# Patient Record
Sex: MALE | Race: WHITE | Employment: OTHER | URBAN - METROPOLITAN AREA
[De-identification: names, ages, dates, MRNs, and addresses within clinical notes are randomized per-mention and may not be internally consistent; named-entity substitution may affect disease eponyms.]

---

## 2019-05-31 ENCOUNTER — HOSPITAL ENCOUNTER (OUTPATIENT)
Facility: CLINIC | Age: 69
Setting detail: OBSERVATION
Discharge: HOME | End: 2019-05-31
Attending: EMERGENCY MEDICINE | Admitting: INTERNAL MEDICINE
Payer: MEDICARE

## 2019-05-31 VITALS
HEIGHT: 72 IN | TEMPERATURE: 98.1 F | RESPIRATION RATE: 16 BRPM | SYSTOLIC BLOOD PRESSURE: 144 MMHG | BODY MASS INDEX: 32.51 KG/M2 | OXYGEN SATURATION: 96 % | WEIGHT: 240 LBS | HEART RATE: 95 BPM | DIASTOLIC BLOOD PRESSURE: 85 MMHG

## 2019-05-31 DIAGNOSIS — I48.92 ATRIAL FLUTTER, UNSPECIFIED TYPE (H): Primary | ICD-10-CM

## 2019-05-31 DIAGNOSIS — I48.4 ATYPICAL ATRIAL FLUTTER (H): ICD-10-CM

## 2019-05-31 LAB
ANION GAP SERPL CALCULATED.3IONS-SCNC: 4 MMOL/L (ref 3–14)
BUN SERPL-MCNC: 16 MG/DL (ref 7–30)
CALCIUM SERPL-MCNC: 8.9 MG/DL (ref 8.5–10.1)
CHLORIDE SERPL-SCNC: 108 MMOL/L (ref 94–109)
CO2 SERPL-SCNC: 28 MMOL/L (ref 20–32)
CREAT SERPL-MCNC: 0.85 MG/DL (ref 0.66–1.25)
GFR SERPL CREATININE-BSD FRML MDRD: >90 ML/MIN/{1.73_M2}
GLUCOSE SERPL-MCNC: 111 MG/DL (ref 70–99)
INTERPRETATION ECG - MUSE: NORMAL
INTERPRETATION ECG - MUSE: NORMAL
MAGNESIUM SERPL-MCNC: 2.2 MG/DL (ref 1.6–2.3)
POTASSIUM SERPL-SCNC: 4.2 MMOL/L (ref 3.4–5.3)
SODIUM SERPL-SCNC: 140 MMOL/L (ref 133–144)
T4 FREE SERPL-MCNC: 1.01 NG/DL (ref 0.76–1.46)
TSH SERPL DL<=0.005 MIU/L-ACNC: 5.06 MU/L (ref 0.4–4)

## 2019-05-31 PROCEDURE — 93005 ELECTROCARDIOGRAM TRACING: CPT | Mod: 76

## 2019-05-31 PROCEDURE — 96365 THER/PROPH/DIAG IV INF INIT: CPT

## 2019-05-31 PROCEDURE — 84439 ASSAY OF FREE THYROXINE: CPT | Performed by: EMERGENCY MEDICINE

## 2019-05-31 PROCEDURE — 96361 HYDRATE IV INFUSION ADD-ON: CPT

## 2019-05-31 PROCEDURE — 80048 BASIC METABOLIC PNL TOTAL CA: CPT | Performed by: EMERGENCY MEDICINE

## 2019-05-31 PROCEDURE — 25000125 ZZHC RX 250: Performed by: EMERGENCY MEDICINE

## 2019-05-31 PROCEDURE — 99207 ZZC APP CREDIT; MD BILLING SHARED VISIT: CPT | Performed by: PHYSICIAN ASSISTANT

## 2019-05-31 PROCEDURE — 25800030 ZZH RX IP 258 OP 636: Performed by: INTERNAL MEDICINE

## 2019-05-31 PROCEDURE — 25000132 ZZH RX MED GY IP 250 OP 250 PS 637: Mod: GY | Performed by: PHYSICIAN ASSISTANT

## 2019-05-31 PROCEDURE — 25000128 H RX IP 250 OP 636: Performed by: EMERGENCY MEDICINE

## 2019-05-31 PROCEDURE — 93005 ELECTROCARDIOGRAM TRACING: CPT

## 2019-05-31 PROCEDURE — 99219 ZZC INITIAL OBSERVATION CARE,LEVL II: CPT | Performed by: INTERNAL MEDICINE

## 2019-05-31 PROCEDURE — 99214 OFFICE O/P EST MOD 30 MIN: CPT | Performed by: INTERNAL MEDICINE

## 2019-05-31 PROCEDURE — G0378 HOSPITAL OBSERVATION PER HR: HCPCS

## 2019-05-31 PROCEDURE — 83735 ASSAY OF MAGNESIUM: CPT | Performed by: EMERGENCY MEDICINE

## 2019-05-31 PROCEDURE — 84443 ASSAY THYROID STIM HORMONE: CPT | Performed by: EMERGENCY MEDICINE

## 2019-05-31 PROCEDURE — 99285 EMERGENCY DEPT VISIT HI MDM: CPT | Mod: 25

## 2019-05-31 RX ORDER — METOPROLOL TARTRATE 25 MG/1
25 TABLET, FILM COATED ORAL 2 TIMES DAILY
Qty: 60 TABLET | Refills: 0 | Status: SHIPPED | OUTPATIENT
Start: 2019-05-31

## 2019-05-31 RX ORDER — BISACODYL 10 MG
10 SUPPOSITORY, RECTAL RECTAL DAILY PRN
Status: DISCONTINUED | OUTPATIENT
Start: 2019-05-31 | End: 2019-05-31 | Stop reason: HOSPADM

## 2019-05-31 RX ORDER — NITROGLYCERIN 0.4 MG/1
0.4 TABLET SUBLINGUAL EVERY 5 MIN PRN
Status: DISCONTINUED | OUTPATIENT
Start: 2019-05-31 | End: 2019-05-31 | Stop reason: HOSPADM

## 2019-05-31 RX ORDER — METOPROLOL TARTRATE 1 MG/ML
2.5 INJECTION, SOLUTION INTRAVENOUS EVERY 4 HOURS PRN
Status: DISCONTINUED | OUTPATIENT
Start: 2019-05-31 | End: 2019-05-31

## 2019-05-31 RX ORDER — ASPIRIN 81 MG/1
81 TABLET ORAL DAILY
COMMUNITY

## 2019-05-31 RX ORDER — METOPROLOL TARTRATE 25 MG/1
25 TABLET, FILM COATED ORAL 2 TIMES DAILY
Status: DISCONTINUED | OUTPATIENT
Start: 2019-05-31 | End: 2019-05-31 | Stop reason: HOSPADM

## 2019-05-31 RX ORDER — AMOXICILLIN 250 MG
2 CAPSULE ORAL 2 TIMES DAILY PRN
Status: DISCONTINUED | OUTPATIENT
Start: 2019-05-31 | End: 2019-05-31 | Stop reason: HOSPADM

## 2019-05-31 RX ORDER — LIDOCAINE 40 MG/G
CREAM TOPICAL
Status: DISCONTINUED | OUTPATIENT
Start: 2019-05-31 | End: 2019-05-31 | Stop reason: HOSPADM

## 2019-05-31 RX ORDER — ACETAMINOPHEN 325 MG/1
650 TABLET ORAL EVERY 4 HOURS PRN
Status: DISCONTINUED | OUTPATIENT
Start: 2019-05-31 | End: 2019-05-31 | Stop reason: HOSPADM

## 2019-05-31 RX ORDER — ONDANSETRON 2 MG/ML
4 INJECTION INTRAMUSCULAR; INTRAVENOUS EVERY 6 HOURS PRN
Status: DISCONTINUED | OUTPATIENT
Start: 2019-05-31 | End: 2019-05-31 | Stop reason: HOSPADM

## 2019-05-31 RX ORDER — CHOLECALCIFEROL (VITAMIN D3) 50 MCG
1 TABLET ORAL DAILY
Status: ON HOLD | COMMUNITY
End: 2019-05-31

## 2019-05-31 RX ORDER — ONDANSETRON 4 MG/1
4 TABLET, ORALLY DISINTEGRATING ORAL EVERY 6 HOURS PRN
Status: DISCONTINUED | OUTPATIENT
Start: 2019-05-31 | End: 2019-05-31 | Stop reason: HOSPADM

## 2019-05-31 RX ORDER — ACETAMINOPHEN 160 MG
1 TABLET,DISINTEGRATING ORAL DAILY
COMMUNITY

## 2019-05-31 RX ORDER — NALOXONE HYDROCHLORIDE 0.4 MG/ML
.1-.4 INJECTION, SOLUTION INTRAMUSCULAR; INTRAVENOUS; SUBCUTANEOUS
Status: DISCONTINUED | OUTPATIENT
Start: 2019-05-31 | End: 2019-05-31 | Stop reason: HOSPADM

## 2019-05-31 RX ORDER — AMOXICILLIN 250 MG
1 CAPSULE ORAL 2 TIMES DAILY PRN
Status: DISCONTINUED | OUTPATIENT
Start: 2019-05-31 | End: 2019-05-31 | Stop reason: HOSPADM

## 2019-05-31 RX ORDER — SODIUM CHLORIDE, SODIUM LACTATE, POTASSIUM CHLORIDE, CALCIUM CHLORIDE 600; 310; 30; 20 MG/100ML; MG/100ML; MG/100ML; MG/100ML
INJECTION, SOLUTION INTRAVENOUS CONTINUOUS
Status: DISCONTINUED | OUTPATIENT
Start: 2019-05-31 | End: 2019-05-31 | Stop reason: HOSPADM

## 2019-05-31 RX ADMIN — SODIUM CHLORIDE 1000 ML: 9 INJECTION, SOLUTION INTRAVENOUS at 03:45

## 2019-05-31 RX ADMIN — Medication 2 G: at 03:45

## 2019-05-31 RX ADMIN — SODIUM CHLORIDE, POTASSIUM CHLORIDE, SODIUM LACTATE AND CALCIUM CHLORIDE: 600; 310; 30; 20 INJECTION, SOLUTION INTRAVENOUS at 07:01

## 2019-05-31 RX ADMIN — METOPROLOL TARTRATE 25 MG: 25 TABLET, FILM COATED ORAL at 12:26

## 2019-05-31 ASSESSMENT — ENCOUNTER SYMPTOMS
PALPITATIONS: 1
COUGH: 0
SHORTNESS OF BREATH: 0
NAUSEA: 0
VOMITING: 0
FEVER: 0

## 2019-05-31 ASSESSMENT — MIFFLIN-ST. JEOR: SCORE: 1901.63

## 2019-05-31 NOTE — PLAN OF CARE
PRIMARY DIAGNOSIS: A flutter  OUTPATIENT/OBSERVATION GOALS TO BE MET BEFORE DISCHARGE:  ADLs back to baseline: Yes    Activity and level of assistance: Ambulating independently.    Pain status: Pain free.    Return to near baseline physical activity: Yes     Discharge Planner Nurse   Safe discharge environment identified: Yes  Barriers to discharge: No       Entered by: David Neal 05/31/2019     Pt alert and oriented x4. Denies pain. Has history of A flutter. States he can feel an abnormal rhythm. Tele is A flutter HR 's. Cardiology consult. NPO w/ ice chips. Independent in room. LR @ 100ml/hr.   /80   Pulse 95   Temp 97.7  F (36.5  C) (Oral)   Resp 18   Ht 1.829 m (6')   Wt 108.9 kg (240 lb)   SpO2 97%   BMI 32.55 kg/m         Please review provider order for any additional goals.   Nurse to notify provider when observation goals have been met and patient is ready for discharge.

## 2019-05-31 NOTE — PLAN OF CARE
PRIMARY DIAGNOSIS: A Flutter  OUTPATIENT/OBSERVATION GOALS TO BE MET BEFORE DISCHARGE:  1. ADLs back to baseline: Yes    2. Activity and level of assistance: Up with standby assistance/Independent.    3. Pain status: Pain free.    4. Return to near baseline physical activity: Yes     Discharge Planner Nurse   Safe discharge environment identified: Yes  Barriers to discharge: Yes, Consult by Cardiology       Entered by: Beau Mann 05/31/2019 6:49 AM    AOx4, VSS, arrived at 0630. Pt will be seen by cardiology today. HAs hx of A flutter. Is on Remote tele. Discharge TBD.  Please review provider order for any additional goals.   Nurse to notify provider when observation goals have been met and patient is ready for discharge.

## 2019-05-31 NOTE — PHARMACY
Anticoagulation coverage check.  Patient has Medicare D through [employer] with $301 (of $365) unmet deductible.    Xarelto/Eliquis  Upon receipt of RX, [facility] can provide 1 month free.  However, [facility] is not in the plan's network so subsequent fills would have to be at [facility], [facility], [facility], or [facility] for coverage.    After the first month free, next fill will be $348 (deductible + copay), and will be $47/mo for the remainder of 2019.    Pradaxa is non-formulary and more expensive.    Jantoven (warfarin)  $0/mo (at [facility], [facility], [facility], or [facility])      -SELVIN Armas, Pharmacy Technician/Liaison, Discharge Pharmacy [phone]

## 2019-05-31 NOTE — DISCHARGE SUMMARY
[facility]  Discharge Summary        Jared Colby MRN# [id number]   YOB: 1950 Age: 69 year old     Date of Admission:  5/31/2019  Date of Discharge:  5/31/2019  1:27 PM  Admitting Physician:  Marques Crawford MD  Discharge Physician: Milady Hankins PA-C  Discharging Service: Hospitalist     Primary Provider: Pallas, Kenneth G  Primary Care Physician Phone Number: [phone]         Discharge Diagnoses/Problem Oriented Hospital Course (Providers):    Jared Colby was admitted on 5/31/2019 by Marques Crawford MD and I would refer you to their history and physical.  The following problems were addressed during his hospitalization:    1. Episode of palpitation in the setting of paroxysmal atrial flutter--seen by cardiology.   Xarelto started as well as Metoprolol. Will follow up with own Cardiologist regarding need for atrial flutter ablation vs cardioversion.            Code Status:      Full Code        Brief Hospital Stay Summary Sent Home With Patient in AVS:        Reason for your hospital stay      You were admitted for concerns of palpitations. You were seen by   Cardiology and recommended to be placed on Metoprolol and Xarelto. Make an   appt with Cardiology in 1 week for further discussion regarding treatment   plans. Hold your metoprolol dose if you have experiencing low HR ie <50's.   Return to ED if you have chest pain, shortness of breath, dizziness or   passing out episodes.         A pleasant 69-year-old gentleman with history of paroxysmal atrial fibrillation with history of several episodes of atrial flutter in the past requiring cardioversion according to patient.  Eventually he underwent ablation for atypical flutter in 2015 with posterior isthmus induced at that time.  He had some evidence of mild asymptomatic sinus node dysfunction  at that time with a baseline heart rate at 60 to the low 60s with other comorbidities of obstructive sleep apnea  on CPAP, now admitted with atrial flutter with mild rapid ventricular rate.      He was admitted to OBS. He was started on xarelto and metoprolol for rate control. He was seen by cardiology and since his sxs are controlled with BB. He was deemed safe for discharge with follow up with his own cardiologist.          Important Results:      No results for input(s): WBC, HGB, HCT, MCV, PLT in the last 168 hours.  No results for input(s): NA, POTASSIUM, CHLORIDE, CO2, ANIONGAP, GLC, BUN, CR, GFRESTIMATED, GFRESTBLACK, VINNY in the last 168 hours.  No results for input(s): CULT in the last 168 hours.  No results for input(s): TSH in the last 168 hours.  No results for input(s): TROPONIN, TROPI, TROPR in the last 168 hours.    Invalid input(s): TROP, TROPONINIES  No results for input(s): COLOR, APPEARANCE, URINEGLC, URINEBILI, URINEKETONE, SG, UBLD, URINEPH, PROTEIN, UROBILINOGEN, NITRITE, LEUKEST, RBCU, WBCU in the last 168 hours.           Pending Results:        Unresulted Labs Ordered in the Past 30 Days of this Admission     Date and Time Order Name Status Description    5/31/2019 0318 T4 free In process             Discharge Instructions and Follow-Up:      Follow-up Appointments     Follow-up and recommended labs and tests       Follow up with Cardiology in 1 week.               Discharge Disposition:      Discharged to home         Discharge Medications:        Current Discharge Medication List      START taking these medications    Details   metoprolol tartrate (LOPRESSOR) 25 MG tablet Take 1 tablet (25 mg) by mouth 2 times daily  Qty: 60 tablet, Refills: 0    Associated Diagnoses: Atrial flutter, unspecified type (H)      rivaroxaban ANTICOAGULANT (XARELTO) 20 MG TABS tablet Take 1 tablet (20 mg) by mouth daily (with dinner)  Qty: 30 tablet, Refills: 0    Associated Diagnoses: Atrial flutter, unspecified type (H)         CONTINUE these medications which have NOT CHANGED    Details   aspirin 81 MG EC tablet Take 81  mg by mouth daily      Cholecalciferol (VITAMIN D3) 2000 units CAPS Take 1 capsule by mouth daily      Levothyroxine Sodium (LEVOTHROID PO) Take 75 mcg by mouth daily       SIMVASTATIN PO Take 40 mg by mouth At Bedtime                Allergies:       No Known Allergies        Consultations This Hospital Stay:      Consultation during this admission received from cardiology         Condition and Physical on Discharge:      Discharge condition: Stable   Vitals: Blood pressure 132/80, pulse 95, temperature 97.7  F (36.5  C), temperature source Oral, resp. rate 18, height 1.829 m (6'), weight 108.9 kg (240 lb), SpO2 97 %.  239 lbs 15.99 oz      GENERAL:  Comfortable.  PSYCH: pleasant, oriented, No acute distress.  HEENT:  PERRLA. Normal conjunctiva, normal hearing, nasal mucosa and Oropharynx are normal.  NECK:  Supple, no neck vein distention, adenopathy or bruits, normal thyroid.  HEART:  Normal S1, S2 with no murmur, no pericardial rub, gallops or S3 or S4.  LUNGS:  Clear to auscultation, normal Respiratory effort. No wheezing, rales or ronchi.  ABDOMEN:  Soft, no hepatosplenomegaly, normal bowel sounds. Non-tender, non distended.   EXTREMITIES:  No pedal edema, +2 pulses bilateral and equal.  SKIN:  Dry to touch, No rash, wound or ulcerations.  NEUROLOGIC:  CN 2-12 intact, BL 5/5 symmetric upper and lower extremity strength, sensation is intact with no focal deficits.         Discharge Time:      <30 mins        Image Results From This Hospital Stay (For Non-EPIC Providers):        Results for orders placed or performed during the hospital encounter of 05/03/15   XR Chest 2 Views    Narrative    CHEST TWO VIEWS  5/3/2015 3:02 PM     HISTORY: Chest pain/shortness of breath.    COMPARISON: 1/4/2014.      Impression    IMPRESSION: No acute cardiopulmonary disease. Left lung base scarring  or atelectasis is stable.       SHEYLA CALVO MD

## 2019-05-31 NOTE — ED TRIAGE NOTES
Pt in with C/O irregular heart rate. Pt reports he has a hx of aflutter, had an ablation previously and has not had sx since. Pt reports onset of sx at midnight. Pt denies chest pain or SOB. Pt A&Ox4 ABCD's intact

## 2019-05-31 NOTE — PLAN OF CARE
Patient's After Visit Summary was reviewed with patient and/or spouse.   Patient verbalized understanding of After Visit Summary, recommended follow up and was given an opportunity to ask questions.   Discharge medications sent home with patient/family: YES, metoprolol and xarelto  Discharged with spouse.    AVS reviewed and questions answered. Pt requested to walk out. Left via private transportation.    OBSERVATION patient END time: 1330

## 2019-05-31 NOTE — PLAN OF CARE
ROOM # 203    Living Situation (if not independent, order SW consult):  Facility name:  : [facility] [phone]    Activity level at baseline: Indpendent  Activity level on admit: Independent/SBA      Patient registered to observation; given Patient Bill of Rights; given the opportunity to ask questions about observation status and their plan of care.  Patient has been oriented to the observation room, bathroom and call light is in place.    Discussed discharge goals and expectations with patient/family.

## 2019-05-31 NOTE — PHARMACY-ADMISSION MEDICATION HISTORY
Admission medication history interview status for this patient is complete. See [facility] admission navigator for allergy information, prior to admission medications and immunization status.     Medication history interview source(s):Patient  Medication history resources (including written lists, pill bottles, clinic record):Dispense records  [facility] [address]    Changes made to PTA medication list:  Added: aspirin, vitamin D3  Deleted: flecainide, metoprolol, rivaroxaban  Changed: added levothyroxine dose    Actions taken by pharmacist (provider contacted, etc):None     Additional medication history information:None    Medication reconciliation/reorder completed by provider prior to medication history? No    Do you take OTC medications (eg tylenol, ibuprofen, fish oil, eye/ear drops, etc)? Y    For patients on insulin therapy: N    Prior to Admission medications    Medication Sig Last Dose Taking? Auth Provider   aspirin 81 MG EC tablet Take 81 mg by mouth daily 5/30/2019 at Unknown time Yes Unknown, Entered By History   Cholecalciferol (VITAMIN D3) 2000 units CAPS Take 1 capsule by mouth daily 5/30/2019 at Unknown time Yes Unknown, Entered By History   Levothyroxine Sodium (LEVOTHROID PO) Take 75 mcg by mouth daily  5/30/2019 at AM Yes Reported, Patient   SIMVASTATIN PO Take 40 mg by mouth At Bedtime  5/30/2019 at PM Yes Reported, Patient

## 2019-05-31 NOTE — ED NOTES
[facility]  ED Nurse Handoff Report    Jared Colby is a 69 year old male   ED Chief complaint: No chief complaint on file.  . ED Diagnosis:   Final diagnoses:   Atypical atrial flutter (H)     Allergies: No Known Allergies    Code Status: Full Code  Activity level - Baseline/Home:  Independent. Activity Level - Current:   Stand with Assist. Lift room needed: No. Bariatric: No   Needed: No   Isolation: No. Infection: Not Applicable.     Vital Signs:   Vitals:    05/31/19 0330 05/31/19 0345 05/31/19 0400 05/31/19 0415   BP: 131/74 138/82 136/85 131/86   Pulse: 87 96 91 88   Resp:       Temp:       TempSrc:       SpO2: 95% 94% 92% 93%   Weight:           Cardiac Rhythm:  ,      Pain level: 0-10 Pain Scale: 0  Patient confused: No. Patient Falls Risk: Yes.   Elimination Status: Has voided   Patient Report - Initial Complaint: irregular(a flutter) heart rate that woke patient out of sleep about midnight.  Hx of a flutter and ablation   Tests Performed:   Abnormal Labs Reviewed   BASIC METABOLIC PANEL - Abnormal; Notable for the following components:       Result Value    Glucose 111 (*)     All other components within normal limits   TSH WITH FREE T4 REFLEX - Abnormal; Notable for the following components:    TSH 5.06 (*)     All other components within normal limits   . Abnormal Results: see above.   Treatments provided: IVF, Magnesium  Family Comments: wife left for home  OBS brochure/video discussed/provided to patient:  N/A  ED Medications:   Medications   0.9% sodium chloride BOLUS (0 mLs Intravenous Stopped 5/31/19 0442)   magnesium sulfate 2 g in NS intermittent infusion ([facility] or [facility]) (0 g Intravenous Stopped 5/31/19 0427)     Drips infusing:  No  For the majority of the shift, the patient's behavior Green.     Severe Sepsis OR Septic Shock Diagnosis Present: No      ED Nurse Name/Phone Number: Kathrinwendy Pradeep,   4:54 AM  RECEIVING UNIT ED HANDOFF REVIEW    Above ED  Nurse Handoff Report was reviewed: Yes  Reviewed by: Beau Mann on May 31, 2019 at 5:46 AM

## 2019-05-31 NOTE — CONSULTS
Consult Date:  05/31/2019      CARDIOLOGY CONSULTATION      REQUESTING PHYSICIAN:  Marques Crawford MD      REASON FOR CONSULTATION:  Recurrent atrial flutter.      CHIEF COMPLAINT:  Palpitations.      HISTORY OF PRESENT ILLNESS:  Mr. Colby is a very pleasant 69-year-old gentleman with history of paroxysmal atrial fibrillation back in 2017 and underwent cardioversion then.  He has history of recurrent atrial flutter with several cardioversions in the past and eventually posterior isthmus ablation done for atypical atrial flutter back in 2015 at [facility].  The patient has other comorbidities of severe obstructive sleep apnea on CPAP, dyslipidemia and hypothyroidism.  The patient follows with the [facility]iology group.  He had a stress test done last fall that did not reveal any objective evidence of echocardiographic inducible ischemia.  LV function was normal at baseline.  The patient tells me that last night he woke up with palpitations.  He had similar episodes intermittently since the ablation but most of these episodes would be of very short duration.  This episode persisted, he came to the ER, was diagnosed with atrial flutter with variable AV block with ventricular rate varying between 80-110s.  At the time of this review, the patient's heart rate is in low 100s.  He is asymptomatic.  He denies any chest discomfort or shortness of breath, any dizziness, any presyncope or syncope.  To be noted, he was found to have some mild sinus node dysfunction while he was undergoing atrial flutter ablation.  The patient tells me at baseline his heart rate is around 60 or low 60s.  He never experienced any syncope or presyncope.  He does not use any tobacco.  He does drink 2 glasses of wine a day.  He has not had any increased ingestion of wine recently.      REVIEW OF SYSTEMS:  A complete review of systems was performed and was negative except in HPI.      PAST MEDICAL HISTORY:   1.  History of atrial  fibrillation in the past, history of atrial flutter requiring several cardioversions, according to patient 4 cardioversions over the course of several years and then he underwent ablation for atypical atrial flutter in 2015.  Since that time, the patient does not have any prolonged episode of palpitations or fluttering, although he had a brief episode of fluttering intermittently.   2.  Obstructive sleep apnea, severe in nature.  The patient is very compliant with CPAP.   3.  Hypothyroidism.   4.  Dyslipidemia.   5.  Mild sinus node dysfunction.  No history of syncope, presyncope, dizziness.  At baseline, patient tells me his heart rate is usually 60 or the low 60s.      ALLERGIES:  NO KNOWN DRUG ALLERGIES.      SOCIAL HISTORY:  As noted above, 2 glasses of wine a day.  No tobacco abuse.      FAMILY HISTORY:  Reviewed and noncontributory.      MEDICATION:  Xarelto 20 mg daily.      PHYSICAL EXAMINATION:   VITAL SIGNS:  Blood pressure 132/80, heart rate in the mid-90s-low 100s, irregular, respiratory rate 18, 97% on room air.   GENERAL:  The patient appears pleasant, comfortable.   NECK:  Normal JVP, no bruit.   CARDIOVASCULAR:  Irregular, borderline tachycardia, no murmur, rub or gallop.   RESPIRATORY:  Clear to auscultation bilaterally.   GASTROINTESTINAL SYSTEM:  Abdomen soft, nontender.   EXTREMITIES:  No pitting pedal edema.   NEUROLOGIC:  Alert and oriented x3.   PSYCHIATRIC:  Normal affect.   SKIN:  No obvious rash.   HEENT:  No pallor or icterus.      LABORATORY:  EKG shows atrial flutter with variable AV block.  Telemetry reviewed and persistently at rest the patient's heart rate is in upper 90s-low 100s.  He is asymptomatic during that time.  I do not see any prolonged pause or AV block, rub or high-degree AV block.  BMP essentially normal.      ASSESSMENT:  A pleasant 69-year-old gentleman with history of paroxysmal atrial fibrillation with history of several episodes of atrial flutter in the past  requiring cardioversion according to patient.  Eventually he underwent ablation for atypical flutter in 2015 with posterior isthmus induced at that time.  He had some evidence of mild asymptomatic sinus node dysfunction  at that time with a baseline heart rate at 60 to the low 60s with other comorbidities of obstructive sleep apnea on CPAP, now admitted with atrial flutter with mild rapid ventricular rate.      I had a long discussion with the patient regarding his clinical presentation.  To be noted, he did intermittently have episodes of fluttering sensation in the chest but this is the episode which lasted the longest since his ablation.  We discussed at length about the further approach in terms of management of atrial flutter.  We talked about rhythm control strategy with MARKUS-guided cardioversion versus oral anticoagulation-guided cardioversion versus the atrial flutter ablation option.  We also talked about only continuing rate control strategy at this time.  After a long discussion, weighing pros and goes, we eventually decided that for now we will try to control the ventricular rate with gentle use of low-dose beta blocker.  We will start metoprolol 25 mg twice a day.  His heart rate is slightly elevated but not a very rapid ventricular rate.  I am expecting that with the low-dose beta blocker, his heart rate should come back within the accepted range.  The patient would like to discuss more about atrial flutter ablation, and he would like to discuss this with his already established cardiologist at the [facility].  He has already been started on Xarelto, which I think is very reasonable.  His CHADS2-VASc score is 1 but, in anticipation of possible need of cardioversion versus ablation, it is optimal to be on oral anticoagulation.  We also talked about that in an unlikely case if he notices his heart rate has become too low and he starts feeling dizziness,  he should stop the beta blocker and should  seek immediate medical care.   1.  Paroxysmal atrial flutter, several recurrences in the past with history of ablation in 2015 with episodes of intermittent palpitations since then, now admitted with atrial flutter with rapid ventricular rate.  He is asymptomatic at rest, heart rate in upper 90s-low 100s.  No prolonged pause noted on telemetry.  CHADS2-VASc score of 1.  Recent stress test done last year showed no evidence of inducible ischemia and normal LVEF.   2.  Obstructive sleep apnea, on CPAP.   3.  History of mild sinus node dysfunction although never symptomatic.  According to patient, his resting heart rate is usually 60 or the low 60s.  No syncope or presyncope.      RECOMMENDATIONS:   1.  Agree with Xarelto for the reasons noted above.   2.  Metoprolol tartrate 25 mg b.i.d.   3.  Discussed in detail with patient about watching for symptoms of any dizziness or lightheadedness, also if his heart rate becomes too low at less than 40, to seek immediate medical care.  The patient would like to follow up with his already established cardiologist to discuss about further options of atrial flutter ablation.  The other option we discussed was cardioversion which could be oral anticoagulation-guided cardioversion versus MARKUS (given intermittent fluttering sensation prior to current episode I do not feel safe with DCCV without ruling out TERRENCE clot or at least 3-4 weeks of oral anticoagulation).  For now, he is essentially asymptomatic and I expect that the low-dose beta blocker should to bring his heart rate into the acceptable range.      Thank you for involving me in the care of Mr. Payan.  The plan was extensively  discussed with the patient.         HERRERA PARKS MD             D: 2019   T: 2019   MT: DARRELL      Name:     KOLTON PAYAN   MRN:      [id number]        Account:       [id number]   :      1950           Consult Date:  2019      Document: [id number]       cc: Marques  Sebring MD Kenneth Pallas MD

## 2019-05-31 NOTE — ED PROVIDER NOTES
History     Chief Complaint:  Palpitations      HPI   Jared Colby is a 69 year old male, with a history of a-flutter s/p ablation, who presents with his wife to the ED for evaluation of palpitations. The patient reports he knows when he is in a-flutter. He woke up at midnight with sensation of irregular heart rate today. He may have been dehydrated yesterday causing his a-flutter. The patient notes he has required cardioversion x4. He sometimes get out of his a-flutter by himself. He did have an ablation 4 years ago and had not had issues since. He is no longer on blood thinners, only a baby Aspirin. The patient denies any shortness of breath, chest pain, fever, cough, nausea, or vomiting.     Allergies:  No known drug allergies    Medications:    Levothyroxine    Simvastatin   Aspirin 81mg      Past Medical History:    A-flutter  ABELARDO  HLD  Hypothyroidism   Colon diverticulitis      Past Surgical History:    A-flutter ablation     Family History:    History reviewed. No pertinent family history.     Social History:  Smoking status: Never smoker    Alcohol use: Yes  Presents to ED with wife    Marital Status:   [5]     Review of Systems   Constitutional: Negative for fever.   Respiratory: Negative for cough and shortness of breath.    Cardiovascular: Positive for palpitations. Negative for chest pain.   Gastrointestinal: Negative for nausea and vomiting.   All other systems reviewed and are negative.    Physical Exam     Patient Vitals for the past 24 hrs:   BP Temp Temp src Pulse Heart Rate Resp SpO2 Weight   05/31/19 0415 131/86 -- -- 88 93 -- 93 % --   05/31/19 0400 136/85 -- -- 91 98 -- 92 % --   05/31/19 0345 138/82 -- -- 96 93 -- 94 % --   05/31/19 0330 131/74 -- -- 87 90 -- 95 % --   05/31/19 0301 (!) 136/100 97.6  F (36.4  C) Temporal 116 -- 16 97 % 108.9 kg (240 lb)     Physical Exam  Constitutional: Alert, attentive, GCS 15  HENT:    Nose: Nose normal.    Mouth/Throat: Oropharynx is  clear, mucous membranes are moist  Eyes: EOM are normal, anicteric, conjugate gaze  CV: tachycardic, irregular rhythm; no murmurs  Chest: Effort normal and breath sounds clear without wheezing or rales, symmetric bilaterally   GI:  non tender. No distension. No guarding or rebound.    MSK: No LE edema, no tenderness to palpation of BLE.  Neurological: Alert, attentive, moving all extremities equally.   Skin: Skin is warm and dry.    Emergency Department Course     ECG #1 (3:13:17):  Rate 113 bpm. WV interval 226. QRS duration 106. QT/QTc 294/403. P-R-T axes 82 32 71. Nonspecific T wave abnormality. Abnormal ECG. A-flutter w/ variable conduction. Significant change compared to EKG dated 5/4/15.   Interpreted at 0313 by Edward Armas MD.    ECG #2 (4:26:55):  Rate 80 bpm. WV interval *. QRS duration 94. QT/QTc 350/403. P-R-T axes 70 31 41. Atrial flutter with variable AV block. Nonspecific T wave abnormality. Abnormal ECG. Significant change compared to EKG dated 19. Interpreted at 0426 by Edward Armas MD.    Laboratory:  Magnesium: 2.2    TSH: 5.06(H)  T4 free: 101    BMP: Glucose 111(H), o/w WNL (Creatinine 0.85)     Interventions:  0345: NS 1L Bolus IV  0345: Magnesium sulfate 2g IV    Emergency Department Course:  Past medical records, nursing notes, and vitals reviewed.  0313: I performed an exam of the patient and obtained history, as documented above.    IV inserted and blood drawn.    0411: I rechecked the patient. Explained findings to patient and wife.    0446: I spoke with Dr. Decker of cardiology.    0502: I spoke to Dr. Crawford of the hospitalist service who accepts the patient for admission.     Findings and plan explained to the Patient and spouse who consents to admission. Discussed the patient with Dr. Crawford, who will admit the patient to an obs tele bed for further monitoring, evaluation, and treatment.     Impression & Plan      Medical Decision Makin-year-old male with  past medical history significant for atrial flutter status post ablation in 2015 not currently on any medications including anticoagulation hypothyroidism presenting for evaluation of palpitations without associated chest pain, chest pressure or shortness of breath.  EKG on arrival shows atrial flutter with atypical conduction and ventricular rates ranging from low 80s after IV fluids of magnesium to low 100s.  Blood pressure within normal limits.  In review of the chart, patient is a high risk sedation due to hypoxia likely secondary to ABELARDO and furthermore I do not feel he warrants emergent cardioversion given his hemodynamic stability and when his ventricular rate improved to the 80s any symptoms suggesting he certainly could have been in a flutter longer than the reported 5 hours.  I did discuss the case with Dr. Decker, who recommended admission to the hospital for cardiology evaluation this morning.  He recommended no medications for rate control for heparinization at this time.  Patient was admitted to observation on telemetry for further monitoring and evaluation.     Diagnosis:    ICD-10-CM   1. Atypical atrial flutter (H) I48.4     Disposition: Patient admitted to an obs tele bed by Dr. Ty Armas MD   [employer]  5:11 AM 05/31/19     Rosalva Thomas  5/31/2019   [facility] EMERGENCY DEPARTMENT    Scribe Disclosure:  Rosalva LOPEZ, am serving as a scribe at 3:13 AM on 5/31/2019 to document services personally performed by Edward Armas MD based on my observations and the provider's statements to me.        Edward Armas MD  05/31/19 0511

## 2019-05-31 NOTE — PROGRESS NOTES
[facility]  History and Physical   Hospitalist Service    Marques Crawford MD    Jared Colby MRN# [id number]   YOB: 1950 Age: 69 year old      Date of Admission:  5/31/2019           Assessment and Plan:   Jared Colby is a 69-year-old male with pretty severe sleep apnea, hypothyroidism, hypercholesterolemia, diverticular disease, and history of atrial flutter with 4 previous cardioversions and a previous ablation about 4 years ago.  He has been off of previous medicines for atrial fibrillation (including metoprolol, flecainide, and Xarelto).  He is on daily aspirin.  He came to the emergency department for evaluation of irregular heart rate.  He does this at approximately 1 AM.  He had palpitations and did not feel quite right but did not have pain, shortness of breath, diaphoresis, lightheadedness, or dizziness.  Emergency department evaluation showed recurrent atrial flutter with variable rate.  Other vital signs were stable.  Labs were unremarkable.  Cardiology was contacted and recommended admission for reevaluation.    Problem list:    1.  Recurrent atrial flutter.  Admit to observation.  Monitor on telemetry.  Start Xarelto this evening.  I will have IV metoprolol available if needed for rate control.  I will consult cardiology per their recommendation.    2.  Obstructive sleep apnea.  CPAP with home settings.    3.  Hypothyroidism.  Resume levothyroxine dose once available.    4.  Hypercholesterolemia.  Resume Lipitor once dose available.    Full code  Xarelto will cover DVT prophylaxis  Disposition: Admit to observation           Code Status:   Full Code         Primary Care Physician:   Pallas, Kenneth G [phone]         Chief Complaint:   Palpitations     History is obtained from Dr. Fer Coleman, and the medical record         History of Present Illness:   Jared Colby is a 69-year-old male with pretty severe sleep apnea, hypothyroidism,  hypercholesterolemia, diverticular disease, and history of atrial flutter with 4 previous cardioversions and a previous ablation about 4 years ago.  He has been off of previous medicines for atrial fibrillation (including metoprolol, flecainide, and Xarelto).  He is on daily aspirin.  He came to the emergency department for evaluation of irregular heart rate.  He does this at approximately 1 AM.  He had palpitations and did not feel quite right but did not have pain, shortness of breath, diaphoresis, lightheadedness, or dizziness.  Emergency department evaluation showed recurrent atrial flutter with variable rate.  Other vital signs were stable.  Labs were unremarkable.  Cardiology was contacted and recommended admission for reevaluation.           Past Medical History:     Patient Active Problem List   Diagnosis     Atrial flutter (H)     Atrial flutter with rapid ventricular response (H)      Past Medical History:   Diagnosis Date     Atrial flutter (H)     s/p electrical cardioversion 6/2011 & 2012     H/O diverticulitis of colon      Hyperlipidemia      Hypothyroidism      ABELARDO (obstructive sleep apnea) dx 2012    on CPAP at night             Past Surgical History:   No past surgical history on file.         Home Medications:     Prior to Admission medications    Medication Sig Last Dose Taking? Auth Provider   flecainide (TAMBOCOR) 100 MG tablet Take 1 tablet (100 mg) by mouth 2 times daily   Derrick Cardona MD   Levothyroxine Sodium (LEVOTHROID PO)    Reported, Patient   metoprolol (TOPROL-XL) 25 MG 24 hr tablet Take 1 tablet (25 mg) by mouth daily   Derrick Cardona MD   rivaroxaban ANTICOAGULANT (XARELTO) 20 MG TABS tablet Take 1 tablet (20 mg) by mouth daily (with dinner)   Derrick Cardona MD   SIMVASTATIN PO Take 40 mg by mouth   Reported, Patient            Allergies:   No Known Allergies         Social History:     Social History     Tobacco Use     Smoking status: Never Smoker   Substance Use Topics      Alcohol use: Yes     Comment: couple per day             Family History:   None per patient         Review of Systems:   The 10 point Review of Systems is negative other than as noted in the HPI.           Physical Exam:   Blood pressure 135/89, pulse 95, temperature 97.6  F (36.4  C), temperature source Temporal, resp. rate 19, weight 108.9 kg (240 lb), SpO2 94 %.  240 lbs 0 oz      GENERAL: Pleasant and cooperative. No acute distress.  EYES: Pupils equal and round. No scleral erythema or icterus.  ENT: External ears are normal without deformity. Posterior oropharynx is without erythem, swelling, or exudate.  NECK: Supple. No masses or swelling. No tenderness. Thyroid is normal without mass or tenderness.  CHEST: Clear to auscultation. Normal breath sounds. No retractions.   CV: Irregular rate and rhythm. No JVD. Pulses normal.  ABDOMEN: Bowel sounds present. No tenderness. No masses or hernia.  EXTREMETIES: No clubbing, cyanosis, or ischemia.  SKIN: Warm and dry to touch. No wounds or rashes.  NEUROLOGIC: Strength and sensation are normal. Deep tendon reflexes are normal. Cranial nerves are normal.             Data:   All new lab and imaging data was reviewed.     Results for orders placed or performed during the hospital encounter of 05/31/19 (from the past 24 hour(s))   EKG 12 lead   Result Value Ref Range    Interpretation ECG Click View Image link to view waveform and result    Basic metabolic panel (BMP)   Result Value Ref Range    Sodium 140 133 - 144 mmol/L    Potassium 4.2 3.4 - 5.3 mmol/L    Chloride 108 94 - 109 mmol/L    Carbon Dioxide 28 20 - 32 mmol/L    Anion Gap 4 3 - 14 mmol/L    Glucose 111 (H) 70 - 99 mg/dL    Urea Nitrogen 16 7 - 30 mg/dL    Creatinine 0.85 0.66 - 1.25 mg/dL    GFR Estimate >90 >60 mL/min/[1.73_m2]    GFR Estimate If Black >90 >60 mL/min/[1.73_m2]    Calcium 8.9 8.5 - 10.1 mg/dL   Magnesium   Result Value Ref Range    Magnesium 2.2 1.6 - 2.3 mg/dL   TSH with free T4 reflex    Result Value Ref Range    TSH 5.06 (H) 0.40 - 4.00 mU/L   T4 free   Result Value Ref Range    T4 Free 1.01 0.76 - 1.46 ng/dL   EKG 12 lead   Result Value Ref Range    Interpretation ECG Click View Image link to view waveform and result